# Patient Record
Sex: FEMALE | Race: WHITE | Employment: OTHER | URBAN - METROPOLITAN AREA
[De-identification: names, ages, dates, MRNs, and addresses within clinical notes are randomized per-mention and may not be internally consistent; named-entity substitution may affect disease eponyms.]

---

## 2020-08-18 ENCOUNTER — TELEPHONE (OUTPATIENT)
Dept: FAMILY MEDICINE CLINIC | Age: 65
End: 2020-08-18

## 2020-08-21 NOTE — TELEPHONE ENCOUNTER
Spoke to patient and she wants to have her labs checked prior to her appointment to see what is causing the swelling.

## 2020-08-21 NOTE — TELEPHONE ENCOUNTER
Spoke to patient and she wants to have her labs checked prior to her appointment to see what is causing the swelling

## 2020-08-24 DIAGNOSIS — E78.00 PURE HYPERCHOLESTEROLEMIA: ICD-10-CM

## 2020-08-24 DIAGNOSIS — R60.0 LOCALIZED EDEMA: Primary | ICD-10-CM

## 2020-08-24 NOTE — PROGRESS NOTES
Pt was advised to make an appt for concerns about swelling in both feet. She stated that she would like to have her labs checked first before coming in for an appt which seemed reasonable.

## 2020-08-27 DIAGNOSIS — R89.9 ABNORMAL LABORATORY TEST RESULT: Primary | ICD-10-CM

## 2020-08-27 LAB
ALBUMIN SERPL-MCNC: 5 G/DL (ref 3.8–4.8)
ALBUMIN/GLOB SERPL: 2.6 {RATIO} (ref 1.2–2.2)
ALP SERPL-CCNC: 64 IU/L (ref 39–117)
ALT SERPL-CCNC: 13 IU/L (ref 0–32)
AST SERPL-CCNC: 17 IU/L (ref 0–40)
BASOPHILS # BLD AUTO: 0.1 X10E3/UL (ref 0–0.2)
BASOPHILS NFR BLD AUTO: 2 %
BILIRUB SERPL-MCNC: 0.5 MG/DL (ref 0–1.2)
BUN SERPL-MCNC: 19 MG/DL (ref 8–27)
BUN/CREAT SERPL: 22 (ref 12–28)
CALCIUM SERPL-MCNC: 9.5 MG/DL (ref 8.7–10.3)
CHLORIDE SERPL-SCNC: 94 MMOL/L (ref 96–106)
CHOLEST SERPL-MCNC: 243 MG/DL (ref 100–199)
CO2 SERPL-SCNC: 27 MMOL/L (ref 20–29)
CREAT SERPL-MCNC: 0.87 MG/DL (ref 0.57–1)
EOSINOPHIL # BLD AUTO: 0.3 X10E3/UL (ref 0–0.4)
EOSINOPHIL NFR BLD AUTO: 7 %
ERYTHROCYTE [DISTWIDTH] IN BLOOD BY AUTOMATED COUNT: 12.8 % (ref 11.7–15.4)
GLOBULIN SER CALC-MCNC: 1.9 G/DL (ref 1.5–4.5)
GLUCOSE SERPL-MCNC: 107 MG/DL (ref 65–99)
HCT VFR BLD AUTO: 35.3 % (ref 34–46.6)
HDLC SERPL-MCNC: 62 MG/DL
HGB BLD-MCNC: 12.1 G/DL (ref 11.1–15.9)
IMM GRANULOCYTES # BLD AUTO: 0 X10E3/UL (ref 0–0.1)
IMM GRANULOCYTES NFR BLD AUTO: 0 %
LDLC SERPL CALC-MCNC: 153 MG/DL (ref 0–99)
LYMPHOCYTES # BLD AUTO: 1.4 X10E3/UL (ref 0.7–3.1)
LYMPHOCYTES NFR BLD AUTO: 34 %
MCH RBC QN AUTO: 32 PG (ref 26.6–33)
MCHC RBC AUTO-ENTMCNC: 34.3 G/DL (ref 31.5–35.7)
MCV RBC AUTO: 93 FL (ref 79–97)
MONOCYTES # BLD AUTO: 0.4 X10E3/UL (ref 0.1–0.9)
MONOCYTES NFR BLD AUTO: 11 %
NEUTROPHILS # BLD AUTO: 1.8 X10E3/UL (ref 1.4–7)
NEUTROPHILS NFR BLD AUTO: 46 %
PLATELET # BLD AUTO: 248 X10E3/UL (ref 150–450)
POTASSIUM SERPL-SCNC: 4.4 MMOL/L (ref 3.5–5.2)
PROT SERPL-MCNC: 6.9 G/DL (ref 6–8.5)
RBC # BLD AUTO: 3.78 X10E6/UL (ref 3.77–5.28)
RETICS/RBC NFR AUTO: 1.5 % (ref 0.6–2.6)
SODIUM SERPL-SCNC: 135 MMOL/L (ref 134–144)
TRIGL SERPL-MCNC: 139 MG/DL (ref 0–149)
VLDLC SERPL CALC-MCNC: 28 MG/DL (ref 5–40)
WBC # BLD AUTO: 4 X10E3/UL (ref 3.4–10.8)

## 2020-08-27 NOTE — PROGRESS NOTES
Error note: Wrong note regarding lab results and liver function tests were put in pt's chart. I communicated w/ staff not to call this pt w/ erroneous information.

## 2020-08-27 NOTE — PROGRESS NOTES
Staff was notified to contact pt regarding elevated liver function test and new order for additional lab work to check for hepatitis.  Also asked that labs be faxed to GI Dr. Rebekah Rios office where he has been referred/

## 2020-12-02 ENCOUNTER — TELEPHONE (OUTPATIENT)
Dept: FAMILY MEDICINE CLINIC | Age: 65
End: 2020-12-02

## 2020-12-21 ENCOUNTER — VIRTUAL VISIT (OUTPATIENT)
Dept: FAMILY MEDICINE CLINIC | Age: 65
End: 2020-12-21
Payer: COMMERCIAL

## 2020-12-21 DIAGNOSIS — F41.9 ANXIETY: ICD-10-CM

## 2020-12-21 DIAGNOSIS — E78.00 PURE HYPERCHOLESTEROLEMIA: ICD-10-CM

## 2020-12-21 DIAGNOSIS — Z13.1 SCREENING FOR DIABETES MELLITUS: ICD-10-CM

## 2020-12-21 DIAGNOSIS — Z13.29 SCREENING FOR THYROID DISORDER: ICD-10-CM

## 2020-12-21 DIAGNOSIS — G43.709 CHRONIC MIGRAINE WITHOUT AURA WITHOUT STATUS MIGRAINOSUS, NOT INTRACTABLE: ICD-10-CM

## 2020-12-21 DIAGNOSIS — I10 ESSENTIAL HYPERTENSION: Primary | ICD-10-CM

## 2020-12-21 DIAGNOSIS — Z13.21 ENCOUNTER FOR VITAMIN DEFICIENCY SCREENING: ICD-10-CM

## 2020-12-21 PROBLEM — L90.0 LICHEN SCLEROSUS ET ATROPHICUS: Status: ACTIVE | Noted: 2019-07-24

## 2020-12-21 PROBLEM — D07.1 VULVAR INTRAEPITHELIAL NEOPLASIA (VIN) GRADE 3: Status: ACTIVE | Noted: 2019-07-24

## 2020-12-21 PROBLEM — Z90.710 H/O: HYSTERECTOMY: Status: ACTIVE | Noted: 2019-07-24

## 2020-12-21 PROCEDURE — 99214 OFFICE O/P EST MOD 30 MIN: CPT | Performed by: NURSE PRACTITIONER

## 2020-12-21 RX ORDER — BUTALBITAL, ACETAMINOPHEN AND CAFFEINE 50; 325; 40 MG/1; MG/1; MG/1
1 TABLET ORAL AS NEEDED
COMMUNITY
End: 2020-12-21 | Stop reason: SDUPTHER

## 2020-12-21 RX ORDER — BUTALBITAL, ACETAMINOPHEN AND CAFFEINE 50; 325; 40 MG/1; MG/1; MG/1
TABLET ORAL
Qty: 8 TAB | Refills: 0 | Status: SHIPPED | OUTPATIENT
Start: 2020-12-21 | End: 2021-04-08

## 2020-12-21 RX ORDER — BUTALBITAL, ACETAMINOPHEN AND CAFFEINE 50; 325; 40 MG/1; MG/1; MG/1
1 TABLET ORAL AS NEEDED
COMMUNITY
Start: 2020-12-17 | End: 2020-12-21 | Stop reason: SDUPTHER

## 2020-12-21 RX ORDER — ACYCLOVIR 400 MG/1
TABLET ORAL
COMMUNITY
Start: 2020-10-26 | End: 2022-03-29 | Stop reason: SDUPTHER

## 2020-12-21 RX ORDER — GLUCOSAMINE/CHONDR SU A SOD 750-600 MG
5000 TABLET ORAL
COMMUNITY

## 2020-12-21 RX ORDER — ZOSTER VACCINE RECOMBINANT, ADJUVANTED 50 MCG/0.5
KIT INTRAMUSCULAR
COMMUNITY
End: 2022-09-06 | Stop reason: ALTCHOICE

## 2020-12-21 RX ORDER — ATENOLOL 100 MG/1
TABLET ORAL
COMMUNITY
End: 2020-12-21 | Stop reason: SDUPTHER

## 2020-12-21 RX ORDER — LISINOPRIL 20 MG/1
TABLET ORAL
COMMUNITY
Start: 2020-09-14 | End: 2020-12-21 | Stop reason: SDUPTHER

## 2020-12-21 RX ORDER — ESCITALOPRAM OXALATE 20 MG/1
TABLET ORAL
COMMUNITY

## 2020-12-21 RX ORDER — FLUCONAZOLE 100 MG/1
100 TABLET ORAL
COMMUNITY
End: 2021-12-31 | Stop reason: ALTCHOICE

## 2020-12-21 RX ORDER — HYDROQUINONE 40 MG/G
CREAM TOPICAL
COMMUNITY

## 2020-12-21 RX ORDER — LISINOPRIL 20 MG/1
20 TABLET ORAL 2 TIMES DAILY
Qty: 180 TAB | Refills: 3 | Status: SHIPPED | OUTPATIENT
Start: 2020-12-21 | End: 2021-07-10 | Stop reason: SDUPTHER

## 2020-12-21 RX ORDER — AMLODIPINE BESYLATE 10 MG/1
TABLET ORAL
Qty: 90 TAB | Refills: 3 | Status: SHIPPED | OUTPATIENT
Start: 2020-12-21 | End: 2021-07-10 | Stop reason: SDUPTHER

## 2020-12-21 RX ORDER — HYDROCHLOROTHIAZIDE 12.5 MG/1
CAPSULE ORAL
Qty: 180 CAP | Refills: 3 | Status: SHIPPED | OUTPATIENT
Start: 2020-12-21 | End: 2021-10-27 | Stop reason: SDUPTHER

## 2020-12-21 RX ORDER — DEXTROAMPHETAMINE SACCHARATE, AMPHETAMINE ASPARTATE, DEXTROAMPHETAMINE SULFATE AND AMPHETAMINE SULFATE 5; 5; 5; 5 MG/1; MG/1; MG/1; MG/1
TABLET ORAL
COMMUNITY
Start: 2020-12-07 | End: 2022-03-28 | Stop reason: ALTCHOICE

## 2020-12-21 RX ORDER — HYDROCORTISONE 25 MG/G
CREAM TOPICAL
COMMUNITY
End: 2022-03-29 | Stop reason: SDUPTHER

## 2020-12-21 RX ORDER — ATENOLOL 100 MG/1
TABLET ORAL
Qty: 90 TAB | Refills: 3 | Status: SHIPPED | OUTPATIENT
Start: 2020-12-21 | End: 2021-07-10 | Stop reason: SDUPTHER

## 2020-12-21 RX ORDER — FLUCONAZOLE 100 MG/1
TABLET ORAL
COMMUNITY
Start: 2020-11-18 | End: 2020-12-21 | Stop reason: SDUPTHER

## 2020-12-21 RX ORDER — HYDROCHLOROTHIAZIDE 12.5 MG/1
CAPSULE ORAL
COMMUNITY
End: 2020-12-21 | Stop reason: SDUPTHER

## 2020-12-21 NOTE — PROGRESS NOTES
No chief complaint on file. There were no vitals taken for this visit. Nyasia Mo is a 63 y.o. female. HPI: This is a video visit performed with doxy. me due to COVID-19 Pandemic. It utilizes video and audio technology that allows real time interaction with the patient. Pt realizes that this is a billable charge and agrees to proceed. Pt presented for f/u and medication refill. Not seen for an appt since Aug 2019. HTN- Not always well controlled. Sometimes as high as 185/85. Pt attributes elevations to anxiety and panic attacks. Does not always remember to check B/P regularly. Anxiety- Continues under care of psychiatrist w/ regular appts every 3 months. Psychiatrist ordering alprazolam, Adderal, quetiapine, Lexapro. Hyperlipidemia- Needs labs to check status. Last labs w/ elevated LDL of 153. Pt does not want to be on another medication so has been focusing on lifestyle changes. Other labs are due- will have staff mail lab slips to pt and can do at her convenience in early 2021. 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     ALPRAZolam (XANAX) 0.5 mg, 4 TIMES DAILY AS NEEDED    amLODIPine (NORVASC) 10 mg tablet TAKE 1 TABLET BY MOUTH DAILY FOR BLOOD PRESSURE.  atenoloL (TENORMIN) 100 mg tablet Take 1 tab by mouth daily.  Biotin 5,000 mcg, Oral    butalbital-acetaminophen-caffeine (FIORICET, ESGIC) -40 mg per tablet Take 1 tab by mouth when other headache remedies not effective. Use sparingly- do not exceed 8 tablets monthly.  calcium-cholecalciferol, d3, (CALCIUM 600 + D) 600-125 mg-unit tab Take  by mouth.  dextroamphetamine-amphetamine (ADDERALL) 20 mg tablet No dose, route, or frequency recorded.  escitalopram oxalate (LEXAPRO) 20 mg tablet escitalopram 20 mg tablet    fluconazole (DIFLUCAN) 100 mg, Oral, EVERY 7 DAYS, FDA advises cautious prescribing of oral fluconazole in pregnancy.  hydroCHLOROthiazide (MICROZIDE) 12.5 mg capsule Take 1 capsule by mouth 2 x daily for blood pressure.  hydrocortisone (Proctozone-HC) 2.5 % rectal cream Proctozone-HC 2.5 % topical cream perineal applicator    hydroquinone (ESOTERICA, MELQUIN) 4 % topical cream hydroquinone 4 % topical cream   APPLY CREAM TO DARK AREAS ON BODY TWICE DAILY    lisinopriL (PRINIVIL, ZESTRIL) 20 mg, Oral, 2 TIMES DAILY    QUEtiapine (SEROQUEL) 50 mg, EVERY BEDTIME    sennosides/docusate sodium (SENOKOT-S PO) 2 Tabs, Oral, DAILY    varicella-zoster recombinant, PF, (Shingrix, PF,) 50 mcg/0.5 mL susr injection Shingrix (PF) 50 mcg/0.5 mL intramuscular suspension, kit     Allergies   Allergen Reactions    Adhesive Other (comments)     REDNESS AND BURNING    Codeine Nausea and Vomiting    Hydrocodone Unknown (comments)     High anxiety    Morphine Other (comments)     Major anxiety    Oxycodone-Acetaminophen Other (comments)     aniexty     Social History     Tobacco Use    Smoking status: Former Smoker     Packs/day: 0.25     Years: 8.00     Pack years: 2.00     Quit date: 1991     Years since quittin.9    Smokeless tobacco: Never Used   Substance Use Topics    Alcohol use:  Yes Frequency: 2-3 times a week     Comment: RARELY    Drug use: No     Family History   Problem Relation Age of Onset    Cancer Mother 68        ovarian cancer WITH METS TO LIVER AND LUNGS AND MELANOMA    Anesth Problems Mother         N&V    Other Mother         LIVER CANCER    Hypertension Father     Cancer Father         MELANOMA    Cancer Sister         MELANOMA    Alzheimer Maternal Grandmother     Alzheimer Maternal Grandfather     Cancer Paternal Grandmother         LIVER    Cancer Sister         MELANOMA    Asthma Daughter         SPORTS INDUCED    Other Daughter         ? UTERINE POLYP     Review of Systems   Constitutional: Negative for chills and fever. HENT: Negative for ear pain and sore throat. Respiratory: Negative for cough, shortness of breath and wheezing. Cardiovascular: Negative for chest pain and palpitations. Gastrointestinal: Negative for abdominal pain, diarrhea, heartburn, nausea and vomiting. Genitourinary: Negative for dysuria. Musculoskeletal: Positive for back pain and neck pain. Negative for myalgias. Neurological: Negative for dizziness, tingling, sensory change and headaches. Psychiatric/Behavioral: Positive for depression. The patient is nervous/anxious and has insomnia. Objective  Physical Exam  Constitutional:       General: She is not in acute distress. Appearance: Normal appearance. HENT:      Head: Normocephalic. Right Ear: External ear normal.      Left Ear: External ear normal.      Nose: Nose normal.   Eyes:      Conjunctiva/sclera: Conjunctivae normal.   Neck:      Musculoskeletal: Neck supple. Pulmonary:      Effort: Pulmonary effort is normal.   Musculoskeletal: Normal range of motion. Comments: Of visible extremities   Skin:     Coloration: Skin is not jaundiced. Neurological:      Mental Status: She is alert and oriented to person, place, and time.    Psychiatric:         Mood and Affect: Mood normal. Behavior: Behavior normal.         Thought Content: Thought content normal.         Judgment: Judgment normal.       Assessment & Plan      ICD-10-CM ICD-9-CM    1. Essential hypertension  I10 401.9 lisinopriL (PRINIVIL, ZESTRIL) 20 mg tablet      atenoloL (TENORMIN) 100 mg tablet      amLODIPine (NORVASC) 10 mg tablet      hydroCHLOROthiazide (MICROZIDE) 12.5 mg capsule   2. Pure hypercholesterolemia  E78.00 272.0 CBC WITH AUTOMATED DIFF      LIPID PANEL      RETICULOCYTE COUNT   3. Chronic migraine without aura without status migrainosus, not intractable  G43.709 346.70 butalbital-acetaminophen-caffeine (FIORICET, ESGIC) -40 mg per tablet   4. Anxiety  F41.9 300.00    5. Encounter for vitamin deficiency screening  Z13.21 V77.99 VITAMIN D, 25 HYDROXY   6. Screening for thyroid disorder  Z13.29 V77.0 T4, FREE      TSH 3RD GENERATION   7. Screening for diabetes mellitus  A02.4 I77.0 METABOLIC PANEL, COMPREHENSIVE      HEMOGLOBIN A1C WITH EAG     1. Essential hypertension  Pt advised to check B/P at least 3 x week w/ goal < than 130/80. Should let me know if not in this range consistently. Pt is on Adderal which might be a factor complicating B/P control. Pt stated she takes 1/2 tab and not full dose. May need to refer to cardiology for B/P management if not effective. - lisinopriL (PRINIVIL, ZESTRIL) 20 mg tablet; Take 1 Tab by mouth two (2) times a day. Dispense: 180 Tab; Refill: 3  - atenoloL (TENORMIN) 100 mg tablet; Take 1 tab by mouth daily. Indications: high blood pressure  Dispense: 90 Tab; Refill: 3  - amLODIPine (NORVASC) 10 mg tablet; TAKE 1 TABLET BY MOUTH DAILY FOR BLOOD PRESSURE. Indications: high blood pressure  Dispense: 90 Tab; Refill: 3  - hydroCHLOROthiazide (MICROZIDE) 12.5 mg capsule; Take 1 capsule by mouth 2 x daily for blood pressure. Dispense: 180 Cap; Refill: 3    2. Pure hypercholesterolemia  F/U labs.  Pt will continue to work on life style measures as she does not want to take a statin. - CBC WITH AUTOMATED DIFF  - LIPID PANEL  - RETICULOCYTE COUNT    3. Chronic migraine without aura without status migrainosus, not intractable  Reviewed possible side effect of rebound headaches if used too often. Will limit to 8 tabs/monthly as needed. - butalbital-acetaminophen-caffeine (FIORICET, ESGIC) -40 mg per tablet; Take 1 tab by mouth when other headache remedies not effective. Use sparingly- do not exceed 8 tablets monthly. Dispense: 8 Tab; Refill: 0    4. Anxiety  Continues under care of psychiatrist w/ regular appts every 3 months for medication management. 5. Encounter for vitamin deficiency screening  F/U labs. - VITAMIN D, 25 HYDROXY; Future    6. Screening for thyroid disorder  F/U labs. - T4, FREE  - TSH 3RD GENERATION    7. Screening for diabetes mellitus  F/U labs. - METABOLIC PANEL, COMPREHENSIVE  - HEMOGLOBIN A1C WITH EAG    I have discussed the diagnosis with the patient and the intended plan as seen in the above orders. Pt/caretaker has expressed understanding. Questions were answered concerning future plans. I have discussed medication side effects and warnings as indicated with the patient as well.     Tammy Davis, TAL

## 2020-12-30 ENCOUNTER — PATIENT MESSAGE (OUTPATIENT)
Dept: FAMILY MEDICINE CLINIC | Age: 65
End: 2020-12-30

## 2021-01-02 DIAGNOSIS — E78.00 PURE HYPERCHOLESTEROLEMIA: Primary | ICD-10-CM

## 2021-01-02 RX ORDER — ATORVASTATIN CALCIUM 40 MG/1
40 TABLET, FILM COATED ORAL DAILY
Qty: 90 TAB | Refills: 3 | Status: SHIPPED | OUTPATIENT
Start: 2021-01-02 | End: 2021-01-12 | Stop reason: ALTCHOICE

## 2021-01-07 ENCOUNTER — PATIENT MESSAGE (OUTPATIENT)
Dept: FAMILY MEDICINE CLINIC | Age: 66
End: 2021-01-07

## 2021-01-12 ENCOUNTER — TELEPHONE (OUTPATIENT)
Dept: FAMILY MEDICINE CLINIC | Age: 66
End: 2021-01-12

## 2021-01-12 DIAGNOSIS — E78.00 PURE HYPERCHOLESTEROLEMIA: Primary | ICD-10-CM

## 2021-01-12 RX ORDER — EZETIMIBE 10 MG/1
10 TABLET ORAL DAILY
Qty: 90 TAB | Refills: 3 | Status: SHIPPED | OUTPATIENT
Start: 2021-01-12 | End: 2021-02-24 | Stop reason: SDUPTHER

## 2021-01-13 LAB
ALBUMIN SERPL-MCNC: 4.8 G/DL (ref 3.8–4.8)
ALBUMIN/GLOB SERPL: 1.9 {RATIO} (ref 1.2–2.2)
ALP SERPL-CCNC: 72 IU/L (ref 39–117)
ALT SERPL-CCNC: 8 IU/L (ref 0–32)
AST SERPL-CCNC: 16 IU/L (ref 0–40)
BASOPHILS # BLD AUTO: 0.1 X10E3/UL (ref 0–0.2)
BASOPHILS NFR BLD AUTO: 2 %
BILIRUB SERPL-MCNC: 0.5 MG/DL (ref 0–1.2)
BUN SERPL-MCNC: 18 MG/DL (ref 8–27)
BUN/CREAT SERPL: 21 (ref 12–28)
CALCIUM SERPL-MCNC: 9.2 MG/DL (ref 8.7–10.3)
CHLORIDE SERPL-SCNC: 97 MMOL/L (ref 96–106)
CHOLEST SERPL-MCNC: 235 MG/DL (ref 100–199)
CO2 SERPL-SCNC: 22 MMOL/L (ref 20–29)
CREAT SERPL-MCNC: 0.85 MG/DL (ref 0.57–1)
EOSINOPHIL # BLD AUTO: 0.2 X10E3/UL (ref 0–0.4)
EOSINOPHIL NFR BLD AUTO: 7 %
ERYTHROCYTE [DISTWIDTH] IN BLOOD BY AUTOMATED COUNT: 12.7 % (ref 11.7–15.4)
EST. AVERAGE GLUCOSE BLD GHB EST-MCNC: 97 MG/DL
GLOBULIN SER CALC-MCNC: 2.5 G/DL (ref 1.5–4.5)
GLUCOSE SERPL-MCNC: 99 MG/DL (ref 65–99)
HBA1C MFR BLD: 5 % (ref 4.8–5.6)
HCT VFR BLD AUTO: 38.3 % (ref 34–46.6)
HDLC SERPL-MCNC: 68 MG/DL
HGB BLD-MCNC: 13 G/DL (ref 11.1–15.9)
IMM GRANULOCYTES # BLD AUTO: 0 X10E3/UL (ref 0–0.1)
IMM GRANULOCYTES NFR BLD AUTO: 0 %
LDLC SERPL CALC-MCNC: 153 MG/DL (ref 0–99)
LYMPHOCYTES # BLD AUTO: 1.3 X10E3/UL (ref 0.7–3.1)
LYMPHOCYTES NFR BLD AUTO: 41 %
MCH RBC QN AUTO: 32.7 PG (ref 26.6–33)
MCHC RBC AUTO-ENTMCNC: 33.9 G/DL (ref 31.5–35.7)
MCV RBC AUTO: 96 FL (ref 79–97)
MONOCYTES # BLD AUTO: 0.4 X10E3/UL (ref 0.1–0.9)
MONOCYTES NFR BLD AUTO: 12 %
NEUTROPHILS # BLD AUTO: 1.2 X10E3/UL (ref 1.4–7)
NEUTROPHILS NFR BLD AUTO: 38 %
PLATELET # BLD AUTO: 236 X10E3/UL (ref 150–450)
POTASSIUM SERPL-SCNC: 4.7 MMOL/L (ref 3.5–5.2)
PROT SERPL-MCNC: 7.3 G/DL (ref 6–8.5)
RBC # BLD AUTO: 3.98 X10E6/UL (ref 3.77–5.28)
RETICS/RBC NFR AUTO: 1.3 % (ref 0.6–2.6)
SODIUM SERPL-SCNC: 133 MMOL/L (ref 134–144)
T4 FREE SERPL-MCNC: 1.05 NG/DL (ref 0.82–1.77)
TRIGL SERPL-MCNC: 81 MG/DL (ref 0–149)
TSH SERPL DL<=0.005 MIU/L-ACNC: 2.05 UIU/ML (ref 0.45–4.5)
VLDLC SERPL CALC-MCNC: 14 MG/DL (ref 5–40)
WBC # BLD AUTO: 3.1 X10E3/UL (ref 3.4–10.8)

## 2021-01-13 NOTE — PROGRESS NOTES
D/C'd atorvastatin due to chance of potential interaction w/ weekly diflucan which can cause myopathy. Changed to zetia.

## 2021-01-13 NOTE — TELEPHONE ENCOUNTER
Pt taking diflucan weekly. Ordered atorvastatin which has potential interaction to increase atorvastatin levels and cause muscle damage. Will check medication to zetia.

## 2021-01-19 DIAGNOSIS — R92.2 INCONCLUSIVE MAMMOGRAM DUE TO DENSE BREASTS: Primary | ICD-10-CM

## 2021-01-19 NOTE — PROGRESS NOTES
Mammogram report recommended pt get US of both breasts due to density. Order put in. Pt notified via [address]

## 2021-01-22 ENCOUNTER — PATIENT MESSAGE (OUTPATIENT)
Dept: FAMILY MEDICINE CLINIC | Age: 66
End: 2021-01-22

## 2021-02-01 NOTE — TELEPHONE ENCOUNTER
Sent patient a message via [employer] on December 17th and she read it December 21st. And has yet to make an appointment in office or virtual.

## 2021-02-02 ENCOUNTER — PATIENT MESSAGE (OUTPATIENT)
Dept: FAMILY MEDICINE CLINIC | Age: 66
End: 2021-02-02

## 2021-02-03 ENCOUNTER — TELEPHONE (OUTPATIENT)
Dept: FAMILY MEDICINE CLINIC | Age: 66
End: 2021-02-03

## 2021-02-03 NOTE — TELEPHONE ENCOUNTER
Luis Reynoso with [employer] wants you to give him a call to do the peer to peer for Mrs. Annabelle Ovalle medication. His number is [phone].

## 2021-02-24 DIAGNOSIS — E78.00 PURE HYPERCHOLESTEROLEMIA: ICD-10-CM

## 2021-02-24 RX ORDER — EZETIMIBE 10 MG/1
10 TABLET ORAL DAILY
Qty: 90 TAB | Refills: 3 | Status: SHIPPED | OUTPATIENT
Start: 2021-02-24 | End: 2021-12-29 | Stop reason: SDUPTHER

## 2021-02-24 NOTE — TELEPHONE ENCOUNTER
Pt stated that the medication has not come from mail order pharmacy yet so I sent in the order again.

## 2021-04-03 DIAGNOSIS — G43.709 CHRONIC MIGRAINE WITHOUT AURA WITHOUT STATUS MIGRAINOSUS, NOT INTRACTABLE: ICD-10-CM

## 2021-04-08 RX ORDER — BUTALBITAL, ACETAMINOPHEN AND CAFFEINE 50; 325; 40 MG/1; MG/1; MG/1
TABLET ORAL
Qty: 8 TAB | Refills: 0 | Status: SHIPPED | OUTPATIENT
Start: 2021-04-08 | End: 2021-04-28

## 2021-04-27 DIAGNOSIS — G43.709 CHRONIC MIGRAINE WITHOUT AURA WITHOUT STATUS MIGRAINOSUS, NOT INTRACTABLE: ICD-10-CM

## 2021-04-28 RX ORDER — BUTALBITAL, ACETAMINOPHEN AND CAFFEINE 50; 325; 40 MG/1; MG/1; MG/1
TABLET ORAL
Qty: 8 TAB | Refills: 0 | Status: SHIPPED | OUTPATIENT
Start: 2021-04-28 | End: 2021-05-25

## 2021-05-21 DIAGNOSIS — G43.709 CHRONIC MIGRAINE WITHOUT AURA WITHOUT STATUS MIGRAINOSUS, NOT INTRACTABLE: ICD-10-CM

## 2021-05-25 RX ORDER — BUTALBITAL, ACETAMINOPHEN AND CAFFEINE 50; 325; 40 MG/1; MG/1; MG/1
TABLET ORAL
Qty: 8 TABLET | Refills: 0 | Status: SHIPPED | OUTPATIENT
Start: 2021-05-25 | End: 2021-06-25

## 2021-06-13 DIAGNOSIS — G43.709 CHRONIC MIGRAINE WITHOUT AURA WITHOUT STATUS MIGRAINOSUS, NOT INTRACTABLE: ICD-10-CM

## 2021-06-25 RX ORDER — BUTALBITAL, ACETAMINOPHEN AND CAFFEINE 50; 325; 40 MG/1; MG/1; MG/1
TABLET ORAL
Qty: 8 TABLET | Refills: 0 | Status: SHIPPED | OUTPATIENT
Start: 2021-06-25 | End: 2021-12-29 | Stop reason: SDUPTHER

## 2021-06-27 DIAGNOSIS — M79.89 SWOLLEN FINGER: Primary | ICD-10-CM

## 2021-07-10 DIAGNOSIS — E78.00 PURE HYPERCHOLESTEROLEMIA: ICD-10-CM

## 2021-07-10 DIAGNOSIS — E55.9 VITAMIN D DEFICIENCY: Primary | ICD-10-CM

## 2021-07-10 DIAGNOSIS — I10 ESSENTIAL HYPERTENSION: ICD-10-CM

## 2021-07-10 RX ORDER — ATENOLOL 100 MG/1
TABLET ORAL
Qty: 90 TABLET | Refills: 1 | Status: SHIPPED | OUTPATIENT
Start: 2021-07-10 | End: 2021-10-27 | Stop reason: SDUPTHER

## 2021-07-10 RX ORDER — AMLODIPINE BESYLATE 10 MG/1
TABLET ORAL
Qty: 90 TABLET | Refills: 1 | Status: SHIPPED | OUTPATIENT
Start: 2021-07-10 | End: 2021-10-27 | Stop reason: SDUPTHER

## 2021-07-10 RX ORDER — LISINOPRIL 20 MG/1
20 TABLET ORAL 2 TIMES DAILY
Qty: 180 TABLET | Refills: 1 | Status: SHIPPED | OUTPATIENT
Start: 2021-07-10 | End: 2021-10-06 | Stop reason: SINTOL

## 2021-07-16 DIAGNOSIS — J40 BRONCHITIS: Primary | ICD-10-CM

## 2021-07-16 RX ORDER — BENZONATATE 100 MG/1
100 CAPSULE ORAL
Qty: 30 CAPSULE | Refills: 0 | Status: SHIPPED | OUTPATIENT
Start: 2021-07-16 | End: 2021-07-26

## 2021-07-16 RX ORDER — PREDNISONE 20 MG/1
20 TABLET ORAL
Qty: 5 TABLET | Refills: 0 | Status: SHIPPED | OUTPATIENT
Start: 2021-07-16 | End: 2021-07-21

## 2021-07-16 RX ORDER — DOXYCYCLINE 100 MG/1
100 CAPSULE ORAL 2 TIMES DAILY
Qty: 10 CAPSULE | Refills: 0 | Status: SHIPPED | OUTPATIENT
Start: 2021-07-16 | End: 2021-07-21

## 2021-07-16 NOTE — PROGRESS NOTES
Pt still having bronchitis symptoms. She was seen at [facility] and received Rxes. She wanted to repeat course of medications. Meds sent in.

## 2021-07-17 LAB
25(OH)D3+25(OH)D2 SERPL-MCNC: 43.3 NG/ML (ref 30–100)
ALBUMIN SERPL-MCNC: 4.8 G/DL (ref 3.8–4.8)
ALBUMIN/GLOB SERPL: 2.3 {RATIO} (ref 1.2–2.2)
ALP SERPL-CCNC: 71 IU/L (ref 48–121)
ALT SERPL-CCNC: 14 IU/L (ref 0–32)
AST SERPL-CCNC: 19 IU/L (ref 0–40)
BASOPHILS # BLD AUTO: 0.1 X10E3/UL (ref 0–0.2)
BASOPHILS NFR BLD AUTO: 2 %
BILIRUB SERPL-MCNC: 0.4 MG/DL (ref 0–1.2)
BUN SERPL-MCNC: 16 MG/DL (ref 8–27)
BUN/CREAT SERPL: 23 (ref 12–28)
CALCIUM SERPL-MCNC: 9.4 MG/DL (ref 8.7–10.3)
CHLORIDE SERPL-SCNC: 90 MMOL/L (ref 96–106)
CHOLEST SERPL-MCNC: 216 MG/DL (ref 100–199)
CO2 SERPL-SCNC: 29 MMOL/L (ref 20–29)
CREAT SERPL-MCNC: 0.7 MG/DL (ref 0.57–1)
EOSINOPHIL # BLD AUTO: 0.3 X10E3/UL (ref 0–0.4)
EOSINOPHIL NFR BLD AUTO: 9 %
ERYTHROCYTE [DISTWIDTH] IN BLOOD BY AUTOMATED COUNT: 12.9 % (ref 11.7–15.4)
GLOBULIN SER CALC-MCNC: 2.1 G/DL (ref 1.5–4.5)
GLUCOSE SERPL-MCNC: 96 MG/DL (ref 65–99)
HCT VFR BLD AUTO: 39.2 % (ref 34–46.6)
HDLC SERPL-MCNC: 57 MG/DL
HGB BLD-MCNC: 13.1 G/DL (ref 11.1–15.9)
IMM GRANULOCYTES # BLD AUTO: 0 X10E3/UL (ref 0–0.1)
IMM GRANULOCYTES NFR BLD AUTO: 0 %
LDLC SERPL CALC-MCNC: 136 MG/DL (ref 0–99)
LYMPHOCYTES # BLD AUTO: 1.3 X10E3/UL (ref 0.7–3.1)
LYMPHOCYTES NFR BLD AUTO: 41 %
MCH RBC QN AUTO: 32.5 PG (ref 26.6–33)
MCHC RBC AUTO-ENTMCNC: 33.4 G/DL (ref 31.5–35.7)
MCV RBC AUTO: 97 FL (ref 79–97)
MONOCYTES # BLD AUTO: 0.5 X10E3/UL (ref 0.1–0.9)
MONOCYTES NFR BLD AUTO: 15 %
MORPHOLOGY BLD-IMP: ABNORMAL
NEUTROPHILS # BLD AUTO: 1 X10E3/UL (ref 1.4–7)
NEUTROPHILS NFR BLD AUTO: 33 %
PLATELET # BLD AUTO: 219 X10E3/UL (ref 150–450)
POTASSIUM SERPL-SCNC: 3.7 MMOL/L (ref 3.5–5.2)
PROT SERPL-MCNC: 6.9 G/DL (ref 6–8.5)
RBC # BLD AUTO: 4.03 X10E6/UL (ref 3.77–5.28)
RETICS/RBC NFR AUTO: 1.6 % (ref 0.6–2.6)
SODIUM SERPL-SCNC: 130 MMOL/L (ref 134–144)
TRIGL SERPL-MCNC: 131 MG/DL (ref 0–149)
VLDLC SERPL CALC-MCNC: 23 MG/DL (ref 5–40)
WBC # BLD AUTO: 3.1 X10E3/UL (ref 3.4–10.8)

## 2021-07-18 DIAGNOSIS — E87.1 HYPONATREMIA: Primary | ICD-10-CM

## 2021-07-18 RX ORDER — SODIUM CHLORIDE TAB 1 GM 1 G
1 TAB MISCELLANEOUS DAILY
Qty: 90 TABLET | Refills: 1 | Status: SHIPPED | OUTPATIENT
Start: 2021-07-18 | End: 2022-03-28 | Stop reason: ALTCHOICE

## 2021-07-18 NOTE — PROGRESS NOTES
Pt w/ low sodium level of 130. She is taking Lexapro daily which has hyponatremia as a side effect. Ordered salt tablets for daily use.

## 2021-07-18 NOTE — PROGRESS NOTES
Sent [employer] message to pt advising she get repeat lab work to check sodium level again after about 2 weeks of daily salt tablets.

## 2021-07-30 LAB
BUN SERPL-MCNC: 17 MG/DL (ref 8–27)
BUN/CREAT SERPL: 23 (ref 12–28)
CALCIUM SERPL-MCNC: 9.6 MG/DL (ref 8.7–10.3)
CHLORIDE SERPL-SCNC: 96 MMOL/L (ref 96–106)
CO2 SERPL-SCNC: 26 MMOL/L (ref 20–29)
CREAT SERPL-MCNC: 0.73 MG/DL (ref 0.57–1)
GLUCOSE SERPL-MCNC: 102 MG/DL (ref 65–99)
POTASSIUM SERPL-SCNC: 4.7 MMOL/L (ref 3.5–5.2)
SODIUM SERPL-SCNC: 135 MMOL/L (ref 134–144)

## 2021-08-17 DIAGNOSIS — D72.818 OTHER DECREASED WHITE BLOOD CELL (WBC) COUNT: Primary | ICD-10-CM

## 2021-09-01 DIAGNOSIS — R09.81 SINUS CONGESTION: Primary | ICD-10-CM

## 2021-10-05 ENCOUNTER — TELEPHONE (OUTPATIENT)
Dept: FAMILY MEDICINE CLINIC | Age: 66
End: 2021-10-05

## 2021-10-05 NOTE — TELEPHONE ENCOUNTER
Patient needs a change for medication for blood pressure has been trying to get some information from you for a couple of weeks please call back

## 2021-10-06 DIAGNOSIS — I10 ESSENTIAL HYPERTENSION: Primary | ICD-10-CM

## 2021-10-06 RX ORDER — LOSARTAN POTASSIUM 50 MG/1
50 TABLET ORAL DAILY
Qty: 90 TABLET | Refills: 1 | Status: SHIPPED | OUTPATIENT
Start: 2021-10-06 | End: 2021-10-07 | Stop reason: SDUPTHER

## 2021-10-06 NOTE — TELEPHONE ENCOUNTER
Pt called back about changing her Blood pressure medication. Pt said you can send it back a message back though the portal and can you send the Rx to her mail order pharmacy [facility].  BRIELLE

## 2021-10-07 DIAGNOSIS — I10 ESSENTIAL HYPERTENSION: ICD-10-CM

## 2021-10-07 RX ORDER — LOSARTAN POTASSIUM 50 MG/1
50 TABLET ORAL DAILY
Qty: 90 TABLET | Refills: 3 | Status: SHIPPED | OUTPATIENT
Start: 2021-10-07 | End: 2021-12-28 | Stop reason: ALTCHOICE

## 2021-10-07 NOTE — TELEPHONE ENCOUNTER
I called and told pt that her Rx was call in to her mail order phar. Pt said she did get [facility] portal message.  BRIELLE

## 2021-10-07 NOTE — TELEPHONE ENCOUNTER
I sent losartan to wrong pharmacy- pt wanted mail order. I resent the order to mail order and notified pt via [employer].

## 2021-10-27 DIAGNOSIS — I10 ESSENTIAL HYPERTENSION: ICD-10-CM

## 2021-10-27 RX ORDER — ATENOLOL 100 MG/1
TABLET ORAL
Qty: 90 TABLET | Refills: 3 | Status: SHIPPED | OUTPATIENT
Start: 2021-10-27

## 2021-10-27 RX ORDER — HYDROCHLOROTHIAZIDE 12.5 MG/1
CAPSULE ORAL
Qty: 180 CAPSULE | Refills: 3 | Status: SHIPPED | OUTPATIENT
Start: 2021-10-27 | End: 2021-12-29 | Stop reason: SDUPTHER

## 2021-10-27 RX ORDER — AMLODIPINE BESYLATE 10 MG/1
TABLET ORAL
Qty: 90 TABLET | Refills: 3 | Status: SHIPPED | OUTPATIENT
Start: 2021-10-27 | End: 2021-12-29 | Stop reason: SDUPTHER

## 2021-12-28 ENCOUNTER — TELEPHONE (OUTPATIENT)
Dept: FAMILY MEDICINE CLINIC | Age: 66
End: 2021-12-28

## 2021-12-28 RX ORDER — LISINOPRIL 20 MG/1
20 TABLET ORAL DAILY
Qty: 90 TABLET | Refills: 1 | Status: SHIPPED | OUTPATIENT
Start: 2021-12-28 | End: 2022-03-28 | Stop reason: ALTCHOICE

## 2021-12-28 NOTE — TELEPHONE ENCOUNTER
----- Message from Richelle Lee sent at 12/28/2021 11:09 AM EST -----  Subject: Medication Problem    QUESTIONS  Name of Medication? lisinopriL (PRINIVIL, ZESTRIL) 20 mg tablet  Patient-reported dosage and instructions? Take 1 Tablet by mouth daily. What question or problem do you have with the medication? urgent within 24   hrs pt requested pt said doesnt take this medication bc she coughs, thinks   she is supposed to be taking lipitor instead of but isn't sure, also, it   was sent to the wrong pharmacy. pt would like a call back due to [employer]   miscommunication  Preferred Pharmacy? [address] phone number (if available)? [phone]  Additional Information for Provider? i didn't see ankit on file, but   she wanted to let you know that it upsets her stomach, she cant remember   what medicineshe stopped taking starts with an f  ---------------------------------------------------------------------------  --------------  CALL BACK INFO  What is the best way for the office to contact you? OK to leave message on   voicemail  Preferred Call Back Phone Number? [phone]  ---------------------------------------------------------------------------  --------------  SCRIPT ANSWERS  Relationship to Patient?  Self

## 2021-12-28 NOTE — TELEPHONE ENCOUNTER
I contacted the patient and she advised that ENT had d/c her Lisinopril Rx as it was the cause of her cough. Patient wanted to relay the following information:  1. Lisinopril d/c by ENT (pharmacy notified [phone])  2. Patient d/c Losartan due to \"GI upset\"  3. Patient currently taking HCTZ, Amlodipine, and Atenolol for b/p control (has not checked b/p while on vacation)  4. GYN advised patient to d/c Zetia as she can now take Lipitor per patient  5. Patient requested that [facility] be removed her pharmacy profile as she only wishes to use mail-order  6. Patient had a question as to whether or not she needs a lab order to recheck her lipids, patient has follow up appointment with [facility] and will likely have lab work done and would like to know if this is needed. Please advise.

## 2021-12-28 NOTE — PROGRESS NOTES
Pt stated that losartan was too hard on her stomach so wanted to go back to lisinopril which has been ordered.

## 2021-12-29 DIAGNOSIS — G43.709 CHRONIC MIGRAINE WITHOUT AURA WITHOUT STATUS MIGRAINOSUS, NOT INTRACTABLE: ICD-10-CM

## 2021-12-29 DIAGNOSIS — E78.00 PURE HYPERCHOLESTEROLEMIA: ICD-10-CM

## 2021-12-29 DIAGNOSIS — I10 ESSENTIAL HYPERTENSION: ICD-10-CM

## 2021-12-31 RX ORDER — AMLODIPINE BESYLATE 10 MG/1
TABLET ORAL
Qty: 90 TABLET | Refills: 3 | Status: SHIPPED | OUTPATIENT
Start: 2021-12-31 | End: 2022-03-29 | Stop reason: SDUPTHER

## 2021-12-31 RX ORDER — EZETIMIBE 10 MG/1
10 TABLET ORAL DAILY
Qty: 90 TABLET | Refills: 3 | Status: SHIPPED | OUTPATIENT
Start: 2021-12-31 | End: 2022-03-28 | Stop reason: ALTCHOICE

## 2021-12-31 RX ORDER — HYDROCHLOROTHIAZIDE 12.5 MG/1
CAPSULE ORAL
Qty: 180 CAPSULE | Refills: 3 | Status: SHIPPED | OUTPATIENT
Start: 2021-12-31 | End: 2022-03-29 | Stop reason: SDUPTHER

## 2021-12-31 RX ORDER — BUTALBITAL, ACETAMINOPHEN AND CAFFEINE 50; 325; 40 MG/1; MG/1; MG/1
TABLET ORAL
Qty: 8 TABLET | Refills: 0 | Status: SHIPPED | OUTPATIENT
Start: 2021-12-31 | End: 2022-03-29 | Stop reason: SDUPTHER

## 2022-03-17 ENCOUNTER — TELEPHONE (OUTPATIENT)
Dept: FAMILY MEDICINE CLINIC | Age: 67
End: 2022-03-17

## 2022-03-17 NOTE — TELEPHONE ENCOUNTER
----- Message from Gianna Blanc sent at 3/17/2022 12:03 PM EDT -----  Subject: Message to Provider    QUESTIONS  Information for Provider? pt is calling to get lab orders put in, pt would   like her rheumatoid arthritis level, thyroid, cholesterol, iron and any   other blood work you might want to at. pt went to a dermatologist and he   told her to have you do these test. pt would like a call when this is   complete. pt would like the orders to go to [facility] across the street.   ---------------------------------------------------------------------------  --------------  [employer]  What is the best way for the office to contact you? OK to leave message on   voicemail  Preferred Call Back Phone Number? [phone]  ---------------------------------------------------------------------------  --------------  SCRIPT ANSWERS  Relationship to Patient?  Self

## 2022-03-19 PROBLEM — Z90.710 H/O: HYSTERECTOMY: Status: ACTIVE | Noted: 2019-07-24

## 2022-03-20 PROBLEM — L90.0 LICHEN SCLEROSUS ET ATROPHICUS: Status: ACTIVE | Noted: 2019-07-24

## 2022-03-20 PROBLEM — D07.1 VULVAR INTRAEPITHELIAL NEOPLASIA (VIN) GRADE 3: Status: ACTIVE | Noted: 2019-07-24

## 2022-03-28 ENCOUNTER — VIRTUAL VISIT (OUTPATIENT)
Dept: FAMILY MEDICINE CLINIC | Age: 67
End: 2022-03-28
Payer: MEDICARE

## 2022-03-28 DIAGNOSIS — I10 ESSENTIAL HYPERTENSION: ICD-10-CM

## 2022-03-28 DIAGNOSIS — B00.1 HERPES LABIALIS: ICD-10-CM

## 2022-03-28 DIAGNOSIS — R73.9 HYPERGLYCEMIA: ICD-10-CM

## 2022-03-28 DIAGNOSIS — K64.9 HEMORRHOIDS, UNSPECIFIED HEMORRHOID TYPE: ICD-10-CM

## 2022-03-28 DIAGNOSIS — M54.2 NECK PAIN: Primary | ICD-10-CM

## 2022-03-28 DIAGNOSIS — E78.00 PURE HYPERCHOLESTEROLEMIA: ICD-10-CM

## 2022-03-28 DIAGNOSIS — K59.00 CONSTIPATION, UNSPECIFIED CONSTIPATION TYPE: ICD-10-CM

## 2022-03-28 DIAGNOSIS — R53.81 MALAISE AND FATIGUE: ICD-10-CM

## 2022-03-28 DIAGNOSIS — E55.9 VITAMIN D DEFICIENCY: ICD-10-CM

## 2022-03-28 DIAGNOSIS — R53.83 MALAISE AND FATIGUE: ICD-10-CM

## 2022-03-28 DIAGNOSIS — R60.9 PERIPHERAL EDEMA: ICD-10-CM

## 2022-03-28 DIAGNOSIS — G43.709 CHRONIC MIGRAINE WITHOUT AURA WITHOUT STATUS MIGRAINOSUS, NOT INTRACTABLE: ICD-10-CM

## 2022-03-28 PROCEDURE — G8756 NO BP MEASURE DOC: HCPCS | Performed by: NURSE PRACTITIONER

## 2022-03-28 PROCEDURE — 99215 OFFICE O/P EST HI 40 MIN: CPT | Performed by: NURSE PRACTITIONER

## 2022-03-28 PROCEDURE — G9899 SCRN MAM PERF RSLTS DOC: HCPCS | Performed by: NURSE PRACTITIONER

## 2022-03-28 PROCEDURE — G8399 PT W/DXA RESULTS DOCUMENT: HCPCS | Performed by: NURSE PRACTITIONER

## 2022-03-28 PROCEDURE — 1101F PT FALLS ASSESS-DOCD LE1/YR: CPT | Performed by: NURSE PRACTITIONER

## 2022-03-28 PROCEDURE — G8427 DOCREV CUR MEDS BY ELIG CLIN: HCPCS | Performed by: NURSE PRACTITIONER

## 2022-03-28 PROCEDURE — 1090F PRES/ABSN URINE INCON ASSESS: CPT | Performed by: NURSE PRACTITIONER

## 2022-03-28 PROCEDURE — G8536 NO DOC ELDER MAL SCRN: HCPCS | Performed by: NURSE PRACTITIONER

## 2022-03-28 PROCEDURE — G8432 DEP SCR NOT DOC, RNG: HCPCS | Performed by: NURSE PRACTITIONER

## 2022-03-28 PROCEDURE — G8421 BMI NOT CALCULATED: HCPCS | Performed by: NURSE PRACTITIONER

## 2022-03-28 PROCEDURE — 3017F COLORECTAL CA SCREEN DOC REV: CPT | Performed by: NURSE PRACTITIONER

## 2022-03-28 RX ORDER — ATORVASTATIN CALCIUM 40 MG/1
TABLET, FILM COATED ORAL
COMMUNITY
Start: 2021-04-09 | End: 2022-03-29 | Stop reason: SDUPTHER

## 2022-03-28 RX ORDER — CYCLOBENZAPRINE HCL 10 MG
10 TABLET ORAL
Qty: 30 TABLET | Refills: 0 | Status: SHIPPED | OUTPATIENT
Start: 2022-03-28 | End: 2022-05-19

## 2022-03-28 RX ORDER — FLUTICASONE PROPIONATE 50 MCG
SPRAY, SUSPENSION (ML) NASAL
COMMUNITY
Start: 2021-12-28

## 2022-03-28 RX ORDER — TRETINOIN 0.25 MG/G
CREAM TOPICAL
COMMUNITY
Start: 2022-03-17

## 2022-03-28 RX ORDER — CLOBETASOL PROPIONATE 0.5 MG/G
OINTMENT TOPICAL
COMMUNITY

## 2022-03-28 RX ORDER — TERCONAZOLE 8 MG/G
CREAM VAGINAL
COMMUNITY

## 2022-03-28 NOTE — PROGRESS NOTES
Luis Fernando Altmar (: 1955) is a 74 y.o. female, established patient, here for evaluation of the following chief complaint(s):   Follow-up       ASSESSMENT/PLAN:  Below is the assessment and plan developed based on review of pertinent history, labs, studies, and medications. 1. Neck pain  -     cyclobenzaprine (FLEXERIL) 10 mg tablet; Take 1 Tablet by mouth nightly as needed for Muscle Spasm(s). , Normal, Disp-30 Tablet, R-0  -     METABOLIC PANEL, COMPREHENSIVE  2. Pure hypercholesterolemia  -     CBC WITH AUTOMATED DIFF  -     RETICULOCYTE COUNT  -     LIPID PANEL  -     atorvastatin (LIPITOR) 40 mg tablet; Take 1 Tablet by mouth daily. , Normal, Disp-90 Tablet, R-3  3. Essential hypertension  -     amLODIPine (NORVASC) 10 mg tablet; TAKE 1 TABLET BY MOUTH DAILY FOR BLOOD PRESSURE. Indications: high blood pressure, Normal, Disp-90 Tablet, R-3  -     hydroCHLOROthiazide (MICROZIDE) 12.5 mg capsule; Take 1 capsule by mouth 2 x daily for blood pressure., Normal, Disp-180 Capsule, R-3  4. Vitamin D deficiency  -     VITAMIN D, 25 HYDROXY  5. Chronic migraine without aura without status migrainosus, not intractable  -     butalbital-acetaminophen-caffeine (FIORICET, ESGIC) -40 mg per tablet; Take 1 tab by mouth as needed for severe migraine., Normal, Disp-8 Tablet, R-0  6. Constipation, unspecified constipation type  -     senna-docusate (Senokot-S) 8.6-50 mg per tablet; Take 2 Tablets by mouth daily as needed for Constipation. , Normal, Disp-90 Tablet, R-3  7. Herpes labialis  -     acyclovir (ZOVIRAX) 400 mg tablet; Take 1 tab by mouth every 12 hours x 5 days for herpes outbreak., Normal, Disp-10 Tablet, R-1  8. Hemorrhoids, unspecified hemorrhoid type  -     hydrocortisone (Procto-Rip) 1 % rectal cream; Insert  into rectum two (2) times a day., Normal, Disp-30 g, R-3  9.  Peripheral edema  -     RHEUMATOID FACTOR, QL  -     LUPUS PROFILE  -     IMMUNOGLOBULINS, G/A/M, QT.  10. Hyperglycemia  - HEMOGLOBIN A1C WITH EAG  11. Malaise and fatigue  -     T4, FREE  -     TSH 3RD GENERATION      Return in about 3 months (around 6/28/2022) for Follow-up lab results, Follow-up chronic medical conditions. 1. Neck pain  Pt finds muscle relaxant to be helpful for symptoms. - cyclobenzaprine (FLEXERIL) 10 mg tablet; Take 1 Tablet by mouth nightly as needed for Muscle Spasm(s). Dispense: 30 Tablet; Refill: 0  - METABOLIC PANEL, COMPREHENSIVE    2. Pure hypercholesterolemia  Pt currently taking atorvastatin 40 mg daily. LDL was 136 mg/d/L 7/15/2021 vs 153 on 1/12/2021. Will adjust medication as indicated. - CBC WITH AUTOMATED DIFF  - RETICULOCYTE COUNT  - LIPID PANEL  - atorvastatin (LIPITOR) 40 mg tablet; Take 1 Tablet by mouth daily. Dispense: 90 Tablet; Refill: 3    3. Essential hypertension  Pt advised to continue to monitor her B/P. She is not receptive to any med changes at this time so will revisit at next encounter if B/P still elevated per home readings. - amLODIPine (NORVASC) 10 mg tablet; TAKE 1 TABLET BY MOUTH DAILY FOR BLOOD PRESSURE. Indications: high blood pressure  Dispense: 90 Tablet; Refill: 3  - hydroCHLOROthiazide (MICROZIDE) 12.5 mg capsule; Take 1 capsule by mouth 2 x daily for blood pressure. Dispense: 180 Capsule; Refill: 3    4. Vitamin D deficiency  Currently taking a daily supplement. Will adjust med dose if indicated by lab. s   - VITAMIN D, 25 HYDROXY    5. Chronic migraine without aura without status migrainosus, not intractable  Pt was again advised to limit use to no more than 8 a month due to chance of rebound headaches due to over use. Pt stated she does not use that often. - butalbital-acetaminophen-caffeine (FIORICET, ESGIC) -40 mg per tablet; Take 1 tab by mouth as needed for severe migraine. Dispense: 8 Tablet; Refill: 0    6. Constipation, unspecified constipation type  Medication effective for maintaining regular bowel regime.      - senna-docusate (Senokot-S) 8.6-50 mg per tablet; Take 2 Tablets by mouth daily as needed for Constipation. Dispense: 90 Tablet; Refill: 3    7. Herpes labialis  Stable- not having many outbreaks. Effective as treatment. - acyclovir (ZOVIRAX) 400 mg tablet; Take 1 tab by mouth every 12 hours x 5 days for herpes outbreak. Dispense: 10 Tablet; Refill: 1    8. Hemorrhoids, unspecified hemorrhoid type  Stable. - hydrocortisone (Procto-Rip) 1 % rectal cream; Insert  into rectum two (2) times a day. Dispense: 30 g; Refill: 3    9. Peripheral edema    - RHEUMATOID FACTOR, QL  - LUPUS PROFILE  - IMMUNOGLOBULINS, G/A/M, QT.    10. Hyperglycemia  Last labwork did not indicated prediabetes or diabetes. Continue to advise heart healthy diet.     - HEMOGLOBIN A1C WITH EAG    11. Malaise and fatigue    - T4, FREE  - TSH 3RD GENERATION        SUBJECTIVE/OBJECTIVE:    HPI     Pt presented for f/u. She needs many medications refilled. She would like to have lab work done and is due   Has c/o of swelling in her legs which seem to appear when there is a change in outdoor temperatures. She went to a beach in [place] in Feb 2022 at which time her feet were really swollen. She noticed a blister on her R foot and she popped it. Now the toes on her R foot are particularly swollen and so are her hands. She also has a growth on the R foot which was identified when she saw ortho provider Dr. Isabel Pa. He put her on a medrol pack which she is still taking and she was advised to get insoles for her feet. She has a f/u pending. Pt is under the care of a psychiatrist for anxiety who is ordering her alprazolam.    HTN- Pt stated that her B/P at home was 147/84 and this is usually the range she gets. She is currently taking amlodipine 10 mg tabs daily, atenolol 100 mg daily, and HCTZ 12.5 mg daily. Lipitor was d/c'd due to a chronic cough.   Discussed increasing the HCTZ dose or changing to chlorthalidone due to its longer half life or adding on an ARB  but she stated she was \"not going to start anything new\" at this time because she wanted to get whatever is going on w/ her R foot taken care of. Long hx of migraines- the migraines wax and wan depending on what is going on stress wise. Her father has dementia and his behavior at time is very upsetting to her and does not really want to discuss him. She also has scoliosis in her back which she thinks might also be contributing to her migraines. Review of Systems   Constitutional: Negative for appetite change, chills, fatigue, fever and unexpected weight change. HENT: Positive for ear pain. Negative for congestion, hearing loss, mouth sores, nosebleeds, postnasal drip, rhinorrhea, sinus pressure, sinus pain, sneezing, sore throat and trouble swallowing. R ear   Eyes: Negative for pain and itching. Respiratory: Negative for cough, chest tightness, shortness of breath and wheezing. Cardiovascular: Negative. Negative for chest pain and palpitations. Gastrointestinal: Positive for constipation. Negative for abdominal pain, diarrhea, nausea and vomiting. Genitourinary: Negative for difficulty urinating, dysuria, flank pain, frequency, hematuria and urgency. Musculoskeletal: Positive for arthralgias and myalgias. Skin: Negative. Negative for rash. Skin lesions on face- seeing a dermatologist.    Allergic/Immunologic: Negative for environmental allergies and food allergies. Neurological: Positive for light-headedness and headaches. Negative for dizziness, tremors, syncope, weakness and numbness. Hematological: Does not bruise/bleed easily. Psychiatric/Behavioral: Positive for dysphoric mood and sleep disturbance. The patient is nervous/anxious.          Has to take Seroquel for sleep        Physical Exam    [INSTRUCTIONS:  \"[x]\" Indicates a positive item  \"[]\" Indicates a negative item  -- DELETE ALL ITEMS NOT EXAMINED]    Constitutional: [x] Appears well-developed and well-nourished [x] No apparent distress      [] Abnormal -     Mental status: [x] Alert and awake  [x] Oriented to person/place/time [x] Able to follow commands    [] Abnormal -     Eyes:   EOM    [x]  Normal    [] Abnormal -   Sclera  [x]  Normal    [] Abnormal -          Discharge [x]  None visible   [] Abnormal -     HENT: [x] Normocephalic, atraumatic  [] Abnormal -   [] Mouth/Throat: Mucous membranes are moist    External Ears [x] Normal  [] Abnormal -    Neck: [x] No visualized mass [] Abnormal -     Pulmonary/Chest: [x] Respiratory effort normal   [x] No visualized signs of difficulty breathing or respiratory distress        [] Abnormal -      Musculoskeletal:   [] Normal gait with no signs of ataxia         [x] Normal range of motion of neck        [] Abnormal -     Neurological:        [x] No  Facial Asymmetry (Cranial nerve 7 motor function) (limited exam due to video visit)          [x] No gaze palsy        [] Abnormal -          Skin:        [x] No significant exanthematous lesions or discoloration noted on facial skin         [] Abnormal -            Psychiatric:       [x] Normal Affect [] Abnormal -        [x] No Hallucinations    Other pertinent observable physical exam findings:-      On this date 03/28/2022 I have spent 50 minutes reviewing previous notes, test results and face to face (virtual) with the patient discussing the diagnosis and importance of compliance with the treatment plan as well as documenting on the day of the visit. Simon Ramirez, was evaluated through a synchronous (real-time) audio-video encounter. The patient (or guardian if applicable) is aware that this is a billable service, which includes applicable co-pays. Verbal consent to proceed has been obtained.  The visit was conducted pursuant to the emergency declaration under the [address] [facility], [address] [place] [address] authority and the [employer] and [employer] [facility] Act.  Patient identification was verified, and a caregiver was present when appropriate. The patient was located at home in a state where the provider was licensed to provide care. An electronic signature was used to authenticate this note.   -- Amari Argueta NP   I  do attest that this note was reviewed and I was available for  TAL NGUYEN on this day of service and will follow along with TAL Bellamy MD

## 2022-03-29 PROBLEM — M54.2 NECK PAIN: Status: ACTIVE | Noted: 2022-03-29

## 2022-03-29 PROBLEM — E55.9 VITAMIN D DEFICIENCY: Status: ACTIVE | Noted: 2022-03-29

## 2022-03-29 PROBLEM — R53.81 MALAISE AND FATIGUE: Status: ACTIVE | Noted: 2022-03-29

## 2022-03-29 PROBLEM — G43.709 CHRONIC MIGRAINE WITHOUT AURA WITHOUT STATUS MIGRAINOSUS, NOT INTRACTABLE: Status: ACTIVE | Noted: 2022-03-29

## 2022-03-29 PROBLEM — E78.00 PURE HYPERCHOLESTEROLEMIA: Status: ACTIVE | Noted: 2022-03-29

## 2022-03-29 PROBLEM — M67.479: Status: ACTIVE | Noted: 2022-03-29

## 2022-03-29 PROBLEM — R73.9 HYPERGLYCEMIA: Status: ACTIVE | Noted: 2022-03-29

## 2022-03-29 PROBLEM — K64.9 HEMORRHOIDS: Status: ACTIVE | Noted: 2022-03-29

## 2022-03-29 PROBLEM — D22.5 MELANOCYTIC NEVI OF TRUNK: Status: ACTIVE | Noted: 2022-03-29

## 2022-03-29 PROBLEM — R53.83 MALAISE AND FATIGUE: Status: ACTIVE | Noted: 2022-03-29

## 2022-03-29 PROBLEM — M62.89 PELVIC FLOOR DYSFUNCTION: Status: ACTIVE | Noted: 2021-07-07

## 2022-03-29 PROBLEM — R60.0 PERIPHERAL EDEMA: Status: ACTIVE | Noted: 2022-03-29

## 2022-03-29 PROBLEM — Q82.8 POROKERATOSIS: Status: ACTIVE | Noted: 2022-03-29

## 2022-03-29 PROBLEM — L82.1 SEBORRHEIC KERATOSIS: Status: ACTIVE | Noted: 2022-03-29

## 2022-03-29 PROBLEM — D48.9 NEOPLASM OF UNCERTAIN BEHAVIOR: Status: ACTIVE | Noted: 2022-03-29

## 2022-03-29 PROBLEM — B00.1 HERPES LABIALIS: Status: ACTIVE | Noted: 2022-03-29

## 2022-03-29 PROBLEM — K59.00 CONSTIPATION: Status: ACTIVE | Noted: 2022-03-29

## 2022-03-29 PROBLEM — I10 ESSENTIAL HYPERTENSION: Status: ACTIVE | Noted: 2022-03-29

## 2022-03-29 PROBLEM — R60.9 PERIPHERAL EDEMA: Status: ACTIVE | Noted: 2022-03-29

## 2022-03-29 PROBLEM — L57.0 ACTINIC KERATOSIS: Status: ACTIVE | Noted: 2022-03-29

## 2022-03-29 RX ORDER — AMLODIPINE BESYLATE 10 MG/1
TABLET ORAL
Qty: 90 TABLET | Refills: 3 | Status: SHIPPED | OUTPATIENT
Start: 2022-03-29

## 2022-03-29 RX ORDER — ATORVASTATIN CALCIUM 40 MG/1
40 TABLET, FILM COATED ORAL DAILY
Qty: 90 TABLET | Refills: 3 | Status: SHIPPED | OUTPATIENT
Start: 2022-03-29

## 2022-03-29 RX ORDER — ACYCLOVIR 400 MG/1
TABLET ORAL
Qty: 10 TABLET | Refills: 1 | Status: SHIPPED | OUTPATIENT
Start: 2022-03-29 | End: 2022-04-14

## 2022-03-29 RX ORDER — METHYLPREDNISOLONE 4 MG/1
TABLET ORAL
COMMUNITY
Start: 2022-03-25 | End: 2022-08-30 | Stop reason: ALTCHOICE

## 2022-03-29 RX ORDER — HYDROCORTISONE 10 MG/G
CREAM TOPICAL 2 TIMES DAILY
Qty: 30 G | Refills: 3 | Status: SHIPPED | OUTPATIENT
Start: 2022-03-29 | End: 2022-06-28

## 2022-03-29 RX ORDER — DEXTROAMPHETAMINE SACCHARATE, AMPHETAMINE ASPARTATE, DEXTROAMPHETAMINE SULFATE AND AMPHETAMINE SULFATE 1.25; 1.25; 1.25; 1.25 MG/1; MG/1; MG/1; MG/1
5 TABLET ORAL
COMMUNITY

## 2022-03-29 RX ORDER — AMOXICILLIN 250 MG
2 CAPSULE ORAL
Qty: 90 TABLET | Refills: 3 | Status: SHIPPED | OUTPATIENT
Start: 2022-03-29

## 2022-03-29 RX ORDER — ATORVASTATIN CALCIUM 40 MG/1
40 TABLET, FILM COATED ORAL DAILY
Qty: 90 TABLET | Refills: 3 | Status: SHIPPED | OUTPATIENT
Start: 2022-03-29 | End: 2022-03-29 | Stop reason: SDUPTHER

## 2022-03-29 RX ORDER — HYDROCHLOROTHIAZIDE 12.5 MG/1
CAPSULE ORAL
Qty: 180 CAPSULE | Refills: 3 | Status: SHIPPED | OUTPATIENT
Start: 2022-03-29

## 2022-03-29 RX ORDER — BUTALBITAL, ACETAMINOPHEN AND CAFFEINE 50; 325; 40 MG/1; MG/1; MG/1
TABLET ORAL
Qty: 8 TABLET | Refills: 0 | Status: SHIPPED | OUTPATIENT
Start: 2022-03-29 | End: 2022-04-06

## 2022-04-03 PROBLEM — M85.80 OSTEOPENIA: Status: ACTIVE | Noted: 2022-04-03

## 2022-04-03 LAB
25(OH)D3+25(OH)D2 SERPL-MCNC: 49.5 NG/ML (ref 30–100)
ALBUMIN SERPL-MCNC: 4.8 G/DL (ref 3.8–4.8)
ALBUMIN/GLOB SERPL: 2.1 {RATIO} (ref 1.2–2.2)
ALP SERPL-CCNC: 54 IU/L (ref 44–121)
ALT SERPL-CCNC: 12 IU/L (ref 0–32)
ANA SER QL: NEGATIVE
AST SERPL-CCNC: 14 IU/L (ref 0–40)
BASOPHILS # BLD AUTO: 0.1 X10E3/UL (ref 0–0.2)
BASOPHILS NFR BLD AUTO: 1 %
BILIRUB SERPL-MCNC: 0.6 MG/DL (ref 0–1.2)
BUN SERPL-MCNC: 13 MG/DL (ref 8–27)
BUN/CREAT SERPL: 15 (ref 12–28)
CALCIUM SERPL-MCNC: 9.4 MG/DL (ref 8.7–10.3)
CHLORIDE SERPL-SCNC: 89 MMOL/L (ref 96–106)
CHOLEST SERPL-MCNC: 183 MG/DL (ref 100–199)
CO2 SERPL-SCNC: 28 MMOL/L (ref 20–29)
CREAT SERPL-MCNC: 0.87 MG/DL (ref 0.57–1)
DSDNA AB SER-ACNC: 1 IU/ML (ref 0–9)
EGFR: 74 ML/MIN/1.73
ENA RNP AB SER-ACNC: <0.2 AI (ref 0–0.9)
ENA SM AB SER-ACNC: <0.2 AI (ref 0–0.9)
EOSINOPHIL # BLD AUTO: 0.2 X10E3/UL (ref 0–0.4)
EOSINOPHIL NFR BLD AUTO: 7 %
ERYTHROCYTE [DISTWIDTH] IN BLOOD BY AUTOMATED COUNT: 12.7 % (ref 11.7–15.4)
EST. AVERAGE GLUCOSE BLD GHB EST-MCNC: 108 MG/DL
GLOBULIN SER CALC-MCNC: 2.3 G/DL (ref 1.5–4.5)
GLUCOSE SERPL-MCNC: 95 MG/DL (ref 65–99)
HBA1C MFR BLD: 5.4 % (ref 4.8–5.6)
HCT VFR BLD AUTO: 39.9 % (ref 34–46.6)
HDLC SERPL-MCNC: 60 MG/DL
HGB BLD-MCNC: 13.8 G/DL (ref 11.1–15.9)
IGA SERPL-MCNC: 66 MG/DL (ref 87–352)
IGG SERPL-MCNC: 907 MG/DL (ref 586–1602)
IGM SERPL-MCNC: 70 MG/DL (ref 26–217)
IMM GRANULOCYTES # BLD AUTO: 0 X10E3/UL (ref 0–0.1)
IMM GRANULOCYTES NFR BLD AUTO: 0 %
LDLC SERPL CALC-MCNC: 108 MG/DL (ref 0–99)
LYMPHOCYTES # BLD AUTO: 1.5 X10E3/UL (ref 0.7–3.1)
LYMPHOCYTES NFR BLD AUTO: 43 %
MCH RBC QN AUTO: 33.2 PG (ref 26.6–33)
MCHC RBC AUTO-ENTMCNC: 34.6 G/DL (ref 31.5–35.7)
MCV RBC AUTO: 96 FL (ref 79–97)
MONOCYTES # BLD AUTO: 0.4 X10E3/UL (ref 0.1–0.9)
MONOCYTES NFR BLD AUTO: 11 %
NEUTROPHILS # BLD AUTO: 1.4 X10E3/UL (ref 1.4–7)
NEUTROPHILS NFR BLD AUTO: 38 %
PLATELET # BLD AUTO: 260 X10E3/UL (ref 150–450)
POTASSIUM SERPL-SCNC: 3.4 MMOL/L (ref 3.5–5.2)
PROT SERPL-MCNC: 7.1 G/DL (ref 6–8.5)
RBC # BLD AUTO: 4.16 X10E6/UL (ref 3.77–5.28)
RETICS/RBC NFR AUTO: 1.2 % (ref 0.6–2.6)
RHEUMATOID FACT SERPL-ACNC: <10 IU/ML (ref 0–15)
SODIUM SERPL-SCNC: 130 MMOL/L (ref 134–144)
T4 FREE SERPL-MCNC: 1.33 NG/DL (ref 0.82–1.77)
TRIGL SERPL-MCNC: 81 MG/DL (ref 0–149)
TSH SERPL DL<=0.005 MIU/L-ACNC: 1.38 UIU/ML (ref 0.45–4.5)
VLDLC SERPL CALC-MCNC: 15 MG/DL (ref 5–40)
WBC # BLD AUTO: 3.5 X10E3/UL (ref 3.4–10.8)

## 2022-04-03 RX ORDER — ALPRAZOLAM 0.25 MG/1
1 TABLET ORAL
COMMUNITY
End: 2022-08-30 | Stop reason: ALTCHOICE

## 2022-04-06 DIAGNOSIS — G43.709 CHRONIC MIGRAINE WITHOUT AURA WITHOUT STATUS MIGRAINOSUS, NOT INTRACTABLE: ICD-10-CM

## 2022-04-06 RX ORDER — BUTALBITAL, ACETAMINOPHEN AND CAFFEINE 50; 325; 40 MG/1; MG/1; MG/1
TABLET ORAL
Qty: 8 TABLET | Refills: 0 | Status: SHIPPED | OUTPATIENT
Start: 2022-04-06

## 2022-05-13 ENCOUNTER — TELEPHONE (OUTPATIENT)
Dept: FAMILY MEDICINE CLINIC | Age: 67
End: 2022-05-13

## 2022-06-14 DIAGNOSIS — E87.6 HYPOKALEMIA: Primary | ICD-10-CM

## 2022-06-14 DIAGNOSIS — M54.2 NECK PAIN: ICD-10-CM

## 2022-06-14 DIAGNOSIS — E87.1 HYPONATREMIA: ICD-10-CM

## 2022-06-14 DIAGNOSIS — E87.0 HYPERNATREMIA: ICD-10-CM

## 2022-06-14 RX ORDER — CYCLOBENZAPRINE HCL 10 MG
TABLET ORAL
Qty: 90 TABLET | Refills: 1 | Status: SHIPPED | OUTPATIENT
Start: 2022-06-14

## 2022-08-03 ENCOUNTER — TELEPHONE (OUTPATIENT)
Dept: FAMILY MEDICINE CLINIC | Age: 67
End: 2022-08-03

## 2022-08-16 ENCOUNTER — TELEPHONE (OUTPATIENT)
Dept: FAMILY MEDICINE CLINIC | Age: 67
End: 2022-08-16

## 2022-08-18 NOTE — TELEPHONE ENCOUNTER
Pt called back and stated that she hadn't realized there was 6 months worth of the medication at [facility] since June and must have clicked the refill button as she was working on transferring everything to mail order. I stated we could transfer it to the mail order, but that we would contact [facility] and cancel what was remaining there. Pt declined doing that at this time and will just discuss it with the provider at her next visit.

## 2022-08-18 NOTE — TELEPHONE ENCOUNTER
Last OV: 3/28/22  Next OV: 8/30/22  Last Refill: 6/14/22 (QTY 90, Refills 1)      Most recent Rx was sent to [facility] although request is coming from mail order pharmacy. Called and LM for pt to call back and clarify where she needs this sent to.

## 2022-08-24 LAB
BUN SERPL-MCNC: 15 MG/DL (ref 8–27)
BUN/CREAT SERPL: 19 (ref 12–28)
CALCIUM SERPL-MCNC: 9.7 MG/DL (ref 8.7–10.3)
CHLORIDE SERPL-SCNC: 95 MMOL/L (ref 96–106)
CO2 SERPL-SCNC: 25 MMOL/L (ref 20–29)
CREAT SERPL-MCNC: 0.79 MG/DL (ref 0.57–1)
EGFR: 83 ML/MIN/1.73
GLUCOSE SERPL-MCNC: 103 MG/DL (ref 65–99)
POTASSIUM SERPL-SCNC: 3.8 MMOL/L (ref 3.5–5.2)
SODIUM SERPL-SCNC: 140 MMOL/L (ref 134–144)

## 2022-08-30 ENCOUNTER — TRANSCRIBE ORDER (OUTPATIENT)
Dept: GENERAL RADIOLOGY | Age: 67
End: 2022-08-30

## 2022-08-30 ENCOUNTER — OFFICE VISIT (OUTPATIENT)
Dept: FAMILY MEDICINE CLINIC | Age: 67
End: 2022-08-30
Payer: MEDICARE

## 2022-08-30 ENCOUNTER — HOSPITAL ENCOUNTER (OUTPATIENT)
Dept: GENERAL RADIOLOGY | Age: 67
Discharge: HOME | End: 2022-08-30
Payer: MEDICARE

## 2022-08-30 VITALS
WEIGHT: 128 LBS | HEIGHT: 67 IN | TEMPERATURE: 96.8 F | SYSTOLIC BLOOD PRESSURE: 124 MMHG | OXYGEN SATURATION: 96 % | DIASTOLIC BLOOD PRESSURE: 78 MMHG | BODY MASS INDEX: 20.09 KG/M2 | RESPIRATION RATE: 18 BRPM | HEART RATE: 60 BPM

## 2022-08-30 DIAGNOSIS — E78.00 PURE HYPERCHOLESTEROLEMIA: ICD-10-CM

## 2022-08-30 DIAGNOSIS — M19.90 OSTEOARTHRITIS: ICD-10-CM

## 2022-08-30 DIAGNOSIS — R73.9 HYPERGLYCEMIA: ICD-10-CM

## 2022-08-30 DIAGNOSIS — E55.9 VITAMIN D DEFICIENCY: ICD-10-CM

## 2022-08-30 DIAGNOSIS — I10 ESSENTIAL HYPERTENSION: Primary | ICD-10-CM

## 2022-08-30 DIAGNOSIS — M15.9 OSTEOARTHRITIS, GENERALIZED: ICD-10-CM

## 2022-08-30 DIAGNOSIS — M15.9 GENERALIZED OSTEOARTHRITIS: Primary | ICD-10-CM

## 2022-08-30 DIAGNOSIS — Z13.29 SCREENING FOR THYROID DISORDER: ICD-10-CM

## 2022-08-30 DIAGNOSIS — M15.9 GENERALIZED OSTEOARTHRITIS: ICD-10-CM

## 2022-08-30 PROCEDURE — 1123F ACP DISCUSS/DSCN MKR DOCD: CPT | Performed by: NURSE PRACTITIONER

## 2022-08-30 PROCEDURE — 3017F COLORECTAL CA SCREEN DOC REV: CPT | Performed by: NURSE PRACTITIONER

## 2022-08-30 PROCEDURE — 73130 X-RAY EXAM OF HAND: CPT

## 2022-08-30 PROCEDURE — G8427 DOCREV CUR MEDS BY ELIG CLIN: HCPCS | Performed by: NURSE PRACTITIONER

## 2022-08-30 PROCEDURE — G8536 NO DOC ELDER MAL SCRN: HCPCS | Performed by: NURSE PRACTITIONER

## 2022-08-30 PROCEDURE — 73620 X-RAY EXAM OF FOOT: CPT

## 2022-08-30 PROCEDURE — G8420 CALC BMI NORM PARAMETERS: HCPCS | Performed by: NURSE PRACTITIONER

## 2022-08-30 PROCEDURE — G9899 SCRN MAM PERF RSLTS DOC: HCPCS | Performed by: NURSE PRACTITIONER

## 2022-08-30 PROCEDURE — 1101F PT FALLS ASSESS-DOCD LE1/YR: CPT | Performed by: NURSE PRACTITIONER

## 2022-08-30 PROCEDURE — G8399 PT W/DXA RESULTS DOCUMENT: HCPCS | Performed by: NURSE PRACTITIONER

## 2022-08-30 PROCEDURE — G8754 DIAS BP LESS 90: HCPCS | Performed by: NURSE PRACTITIONER

## 2022-08-30 PROCEDURE — G8752 SYS BP LESS 140: HCPCS | Performed by: NURSE PRACTITIONER

## 2022-08-30 PROCEDURE — 1090F PRES/ABSN URINE INCON ASSESS: CPT | Performed by: NURSE PRACTITIONER

## 2022-08-30 PROCEDURE — G8432 DEP SCR NOT DOC, RNG: HCPCS | Performed by: NURSE PRACTITIONER

## 2022-08-30 PROCEDURE — 99214 OFFICE O/P EST MOD 30 MIN: CPT | Performed by: NURSE PRACTITIONER

## 2022-08-30 RX ORDER — ALPRAZOLAM 0.5 MG/1
TABLET ORAL
COMMUNITY
Start: 2022-07-10

## 2022-08-30 RX ORDER — MELOXICAM 7.5 MG/1
7.5 TABLET ORAL DAILY
COMMUNITY
Start: 2022-06-30

## 2022-08-30 RX ORDER — BUPROPION HYDROCHLORIDE 150 MG/1
TABLET ORAL
COMMUNITY
Start: 2022-07-08

## 2022-08-30 NOTE — PROGRESS NOTES
Visit Vitals  /78 (BP 1 Location: Right arm, BP Patient Position: Sitting)   Pulse 60   Temp 96.8 °F (36 °C) (Temporal)   Resp 18   Ht 5' 7\" (1.702 m)   Wt 128 lb (58.1 kg)   SpO2 96%   BMI 20.05 kg/m²     Chief Complaint   Patient presents with    Labs     Pt stated that she had labs done last week and would like to review them with the provider. 1. Have you been to the ER, urgent care clinic since your last visit? Hospitalized since your last visit? 2. Have you seen or consulted any other health care providers outside of the [address] [place] [address] since your last visit? Include any pap smears or colon screening.  Yes, [place] [facility]

## 2022-08-30 NOTE — PROGRESS NOTES
Chief Complaint   Patient presents with    Labs     Pt stated that she had labs done last week and would like to review them with the provider. Visit Vitals  /78 (BP 1 Location: Right arm, BP Patient Position: Sitting)   Pulse 60   Temp 96.8 °F (36 °C) (Temporal)   Resp 18   Ht 5' 7\" (1.702 m)   Wt 128 lb (58.1 kg)   SpO2 96%   BMI 20.05 kg/m²     Subjective  Dandy Jenkins is a 74 y.o. female. HPI  Patient presented for follow-up and would like to discuss her recent lab results which were done at rheumatology office of Dr. Baltazar Laurent. Pt last seen by me for virtual appt on 3/28/2022. All recent lab results in record  discussed w/ pt and questions answered. I was unable to see any lab results from her rheumatologist's office as not in her record at this time so unable to discuss results. Medical hx significant for many co-morbidities to include hypercholesterolemia, hypertension, vitamin D deficiency, chronic migraines, constipation, osteoarthritis of multiple joints, osteopenia, pelvic floor dysfunction, anxiety and depression. Patient has a number of stressors in her life to include recent decline of her father's medical and mental conditions. Patient is under the care of provider at [facility] where she has routine well woman follow-ups, mammograms, and will have a bone density done there in near future. She was having a lot of pain in her jaw and now has a mouth guard which is helpful. Apparently she is grinding her teeth. She is also seeing [facility] Dr. Ernie Chairez to address a small 'hole\" in her R foot which she has had for some time. No drainage   She also has a pending appt w/ rheumatologist Israel Jackson for c/o swollen joint of 2,3,and 4th toes of R food. She is also having \"crippling\" pain in many joints of both hands. Hypertension- B/P good in office at 124/78. Pt stated that she is off lisinopril because she developed a cough.  Her B/P has been hard to control in past. Was concerned about effect of Adderall on her B/P. Pt stated that she is trying to get off of it but so far feels she cannot. Current dose is 5 mg daily down from 10 mg daily which is Rxed by her psychiatrist.   Current med regimen for B/P is amlodipine 10 mg daily, HCTZ 12.5 mg daily, and atenolol 100 mg daily, Pt advised to check her B/P several times a week w/ goal < 130/80. She will continue on current med regime. Psychiatrist is also managing Rxes of alprazolam, anti-depressants and quetiapine for pt. Review of PDMP report indicated that pt last filled Rx for alprazolam 0. 5 mg on 7/11/2022 for 90 tabs x 30 days. Report also revealed that pt is using medical marijuana extract cartridges which are reported on PDMP report. Under care of provider Lexy Arora who may be an on line provider. It this is helping pt lower the dose and frequency of her other ordered controlled medications then I see some benefit to her. Hyperlipidemia- last LDL improved at 108 mg/dL vs 136 on 7/15/2021 and 153 mg/dL on 1/12/2021. Pt will continue to work on lifestyle measures and atorvastatin 40 mg daily. I will put in lab orders for pt to complete in a fasting state prior to next appt.      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    Digital mucinous cyst of toe M67.479    Actinic keratosis L57.0    Seborrheic keratosis L82.1    Melanocytic nevi of trunk D22.5    Neoplasm of uncertain behavior T87.1    Pelvic floor dysfunction M62.89    Porokeratosis Q82.8    Neck pain M54.2    Pure hypercholesterolemia E78.00    Essential hypertension I10    Vitamin D deficiency E55.9    Chronic migraine without aura without status migrainosus, not intractable G43.709    Constipation K59.00    Herpes labialis B00.1    Hemorrhoids K64.9    Peripheral edema R60.9    Hyperglycemia R73.9    Malaise and fatigue R53.81, R53.83    Osteopenia M85.80    Osteoarthritis of both feet M19.071, M19.072    Osteoarthritis of hands, bilateral M19.041, M19.042    Medical marijuana use Z79.899     Past Medical History:   Diagnosis Date    Anxiety     Cancer ([facility] [address] 2009    MELANOMA RIGTH ARM    Headache(784.0)     Nausea & vomiting     Psychiatric disorder 2006    PANIC DISORDER-SEVERE ANXIETY     Past Surgical History:   Procedure Laterality Date    HX APPENDECTOMY  2001    HX GI      colonscopy    HX MARIUSZ AND BSO  2001    hysterectomy, laser    HX UROLOGICAL  2006    SLING     Current Outpatient Medications   Medication Instructions    acyclovir (ZOVIRAX) 400 mg tablet TAKE 1 TABLET EVERY 12 HOURS FOR 5 DAYS FOR HERPES OUTBREAK    ALPRAZolam (XANAX) 0.5 mg tablet No dose, route, or frequency recorded. amLODIPine (NORVASC) 10 mg tablet TAKE 1 TABLET BY MOUTH DAILY FOR BLOOD PRESSURE. atenoloL (TENORMIN) 100 mg tablet Take 1 tab by mouth daily. atorvastatin (LIPITOR) 40 mg, Oral, DAILY    Biotin 5,000 mcg, Oral    buPROPion XL (WELLBUTRIN XL) 150 mg tablet No dose, route, or frequency recorded. butalbital-acetaminophen-caffeine (FIORICET, ESGIC) -40 mg per tablet TAKE 1 TABLET BY MOUTH AS NEEDED FOR SEVERE MIGRAINE.    calcium-cholecalciferol, d3, 600-125 mg-unit tab Take  by mouth.    clobetasoL (TEMOVATE) 0.05 % ointment clobetasol 0.05 % topical ointment   APPLY TOPICALLY TWO TIMES ADAY AS NEEDED    cyclobenzaprine (FLEXERIL) 10 mg tablet TAKE 1 TABLET BY MOUTH EVERY NIGHT AS NEEDED FOR MUSCLE SPASMS    dextroamphetamine-amphetamine (ADDERALL) 5 mg tablet 5 mg, Oral    escitalopram oxalate (LEXAPRO) 20 mg tablet escitalopram 20 mg tablet    fluticasone propionate (FLONASE) 50 mcg/actuation nasal spray SHAKE LIQUID AND USE 1 TO 2 SPRAYS IN EACH NOSTRIL DAILY    hydroCHLOROthiazide (MICROZIDE) 12.5 mg capsule Take 1 capsule by mouth 2 x daily for blood pressure. hydrocortisone (PROCTOCORT) 1 % rectal cream INSERT  INTO RECTUM TWO TIMES A DAY. hydroquinone (ESOTERICA, MELQUIN) 4 % topical cream hydroquinone 4 % topical cream   APPLY CREAM TO DARK AREAS ON BODY TWICE DAILY    meloxicam (MOBIC) 7.5 mg, Oral, DAILY    QUEtiapine (SEROQUEL) 50 mg, EVERY BEDTIME    senna-docusate (Senokot-S) 8.6-50 mg per tablet 2 Tablets, Oral, DAILY AS NEEDED    terconazole (TERAZOL 3) 0.8 % vaginal cream terconazole 0.8 % vaginal cream   Insert 1 applicator full into vagina weekly as directed. tretinoin (RETIN-A) 0.025 % topical cream 1 application in the evening to face     Allergies   Allergen Reactions    Adhesive Other (comments)     REDNESS AND BURNING    Adhesive Tape-Silicones Other (comments)     Other reaction(s): Other (comments)  REDNESS AND BURNING    Codeine Nausea and Vomiting    Hydrocodone Unknown (comments)     High anxiety    Morphine Other (comments)     Major anxiety    Oxycodone-Acetaminophen Other (comments)     aniexty     Social History     Tobacco Use    Smoking status: Former     Packs/day: 0.25     Years: 8.00     Pack years: 2.00     Types: Cigarettes     Quit date: 1991     Years since quittin.6    Smokeless tobacco: Never   Substance Use Topics    Alcohol use: Yes     Comment: RARELY    Drug use: No     Family History   Problem Relation Age of Onset    Cancer Mother 68        ovarian cancer WITH METS TO LIVER AND LUNGS AND MELANOMA    Anesth Problems Mother         N&V    Other Mother         LIVER CANCER    Hypertension Father     Cancer Father         MELANOMA    Cancer Sister         MELANOMA    Alzheimer's Disease Maternal Grandmother     Alzheimer's Disease Maternal Grandfather     Cancer Paternal Grandmother         LIVER    Cancer Sister         MELANOMA    Asthma Daughter         SPORTS INDUCED    Other Daughter         ? UTERINE POLYP     Review of Systems   Constitutional:  Negative for chills, fever and malaise/fatigue.    HENT: Negative for congestion, ear pain and sore throat. Eyes:  Negative for blurred vision. Respiratory:  Negative for cough, shortness of breath and wheezing. Cardiovascular:  Negative for chest pain, palpitations and leg swelling. Gastrointestinal:  Positive for vomiting. Negative for abdominal pain, constipation, diarrhea, heartburn and nausea. Genitourinary:  Negative for dysuria. Musculoskeletal:  Positive for joint pain. Negative for back pain, falls, myalgias and neck pain. Neurological:  Positive for dizziness and headaches. Negative for tingling, sensory change and weakness. Psychiatric/Behavioral:  Negative for depression. The patient is nervous/anxious and has insomnia. Objective  Physical Exam  Vitals reviewed. Constitutional:       General: She is not in acute distress. Appearance: Normal appearance. She is normal weight. HENT:      Head: Normocephalic. Right Ear: External ear normal.      Left Ear: External ear normal.      Nose: Nose normal.   Eyes:      Conjunctiva/sclera: Conjunctivae normal.   Neck:      Vascular: No carotid bruit. Cardiovascular:      Rate and Rhythm: Normal rate and regular rhythm. Heart sounds: Normal heart sounds. No murmur heard. Pulmonary:      Effort: Pulmonary effort is normal. No respiratory distress. Breath sounds: Normal breath sounds. Musculoskeletal:         General: No swelling or tenderness. Normal range of motion. Cervical back: Neck supple. Right lower leg: No edema. Left lower leg: No edema. Skin:     General: Skin is warm and dry. Coloration: Skin is not jaundiced. Findings: No lesion or rash. Neurological:      Mental Status: She is alert and oriented to person, place, and time. Motor: No weakness. Gait: Gait normal.   Psychiatric:         Mood and Affect: Mood normal.         Behavior: Behavior normal.         Thought Content:  Thought content normal.         Judgment: Judgment normal.       Assessment & Plan      ICD-10-CM ICD-9-CM    1. Essential hypertension  I10 401.9       2. Pure hypercholesterolemia  E78.00 272.0 CBC WITH AUTOMATED DIFF      RETICULOCYTE COUNT      LIPID PANEL      3. Vitamin D deficiency  E55.9 268.9 VITAMIN D, 25 HYDROXY      4. Hyperglycemia  A31.6 325.54 METABOLIC PANEL, COMPREHENSIVE      HEMOGLOBIN A1C WITH EAG      5. Screening for thyroid disorder  Z13.29 V77.0 TSH 3RD GENERATION      T4, FREE          1. Essential hypertension  Improved in office. Pt advised to check a few times a week w/ goal still < 130/80 and will continue on current med regimen. 2. Pure hypercholesterolemia  Improved. Will continue on statin at same dose and frequency. Will also continue to focus on lifestyle and diet. - CBC WITH AUTOMATED DIFF  - RETICULOCYTE COUNT  - LIPID PANEL    3. Vitamin D deficiency  Currently on a supplement. Will adjust dose if not at goal > 30.   - VITAMIN D, 25 HYDROXY    4. Hyperglycemia  Last A1c in normal range. Will continue to monitor.     - METABOLIC PANEL, COMPREHENSIVE  - HEMOGLOBIN A1C WITH EAG    5. Screening for thyroid disorder    - TSH 3RD GENERATION  - T4, FREE       I have discussed the diagnosis with the patient and the intended plan as seen in the above orders. Pt/caretaker has expressed understanding. Questions were answered concerning future plans. I have discussed medication side effects and warnings as indicated with the patient as well.     Heydi Hidalgo NP

## 2022-09-06 ENCOUNTER — TELEPHONE (OUTPATIENT)
Dept: FAMILY MEDICINE CLINIC | Age: 67
End: 2022-09-06

## 2022-09-06 PROBLEM — M19.071 OSTEOARTHRITIS OF BOTH FEET: Status: ACTIVE | Noted: 2022-09-06

## 2022-09-06 PROBLEM — M19.072 OSTEOARTHRITIS OF BOTH FEET: Status: ACTIVE | Noted: 2022-09-06

## 2022-09-06 PROBLEM — M19.042 OSTEOARTHRITIS OF HANDS, BILATERAL: Status: ACTIVE | Noted: 2022-09-06

## 2022-09-06 PROBLEM — M19.041 OSTEOARTHRITIS OF HANDS, BILATERAL: Status: ACTIVE | Noted: 2022-09-06

## 2022-09-06 PROBLEM — Z79.899 MEDICAL MARIJUANA USE: Status: ACTIVE | Noted: 2022-09-06

## 2022-09-06 NOTE — TELEPHONE ENCOUNTER
Please call the [facility] in [place] for any immunizations given pt to include Shingrix shingles shots and update medical record as needed. Thanks.

## 2022-09-07 NOTE — TELEPHONE ENCOUNTER
Called the pharmacy and all they had on file was to pt's two COVID shots which  I have updated along with other immunizations from the pt's [facility] portal report, also scanned into .

## 2022-09-26 NOTE — PROGRESS NOTES
Reviewed. [facility] / [facility] ([facility]) [address] [facility], [address]    Acknowledgment of Informed Consent for Surgical or Medical Procedure and Sedation  I agree to allow doctor(s) Jennifer Lugo and his/her associates or assistants, including residents and/or other qualified medical practitioner to perform the following medical treatment or procedure and to administer or direct the administration of sedation as necessary:  Procedure(s): Boogie Robison  My doctor has explained the following regarding the proposed procedure:   the explanation of the procedure   the benefits of the procedure   the potential problems that might occur during recuperation   the risks and side effects of the procedure which could include but are not limited to severe blood loss, infection, stroke or death   the benefits, risks and side effect of alternative procedures including the consequences of declining this procedure or any alternative procedures   the likelihood of achieving satisfactory results. I acknowledge no guarantee or assurance has been made to me regarding the results. I understand that during the course of this treatment/procedure, unforeseen conditions can occur which require an additional or different procedure. I agree to allow my physician or assistants to perform such extension of the original procedure as they may find necessary. I understand that sedation will often result in temporary impairment of memory and fine motor skills and that sedation can occasionally progress to a state of deep sedation or general anesthesia. I understand the risks of anesthesia for surgery include, but are not limited to, sore throat, hoarseness, injury to face, mouth, or teeth; nausea; headache; injury to blood vessels or nerves; death, brain damage, or paralysis.     I understand that if I have a Limitation of Treatment order in effect during my hospitalization, the order may or may not be in effect during this procedure. I give my doctor permission to give me blood or blood products. I understand that there are risks with receiving blood such as hepatitis, AIDS, fever, or allergic reaction. I acknowledge that the risks, benefits, and alternatives of this treatment have been explained to me and that no express or implied warranty has been given by the hospital, any blood bank, or any person or entity as to the blood or blood components transfused. At the discretion of my doctor, I agree to allow observers, equipment/product representatives and allow photographing, and/or televising of the procedure, provided my name or identity is maintained confidentially. I agree the hospital may dispose of or use for scientific or educational purposes any tissue, fluid, or body parts which may be removed.     ________________________________Date________Time______ am/pm  ([facility])  Patient or Signature of Closest Relative or Legal Guardian    ________________________________Date________Time______am/pm      Page 1 of  1  Witness

## 2023-01-01 DIAGNOSIS — K64.9 HEMORRHOIDS, UNSPECIFIED HEMORRHOID TYPE: ICD-10-CM

## 2023-01-03 RX ORDER — HYDROCORTISONE 10 MG/G
CREAM TOPICAL
Qty: 28 G | Refills: 0 | Status: SHIPPED | OUTPATIENT
Start: 2023-01-03

## 2023-02-18 DIAGNOSIS — K64.9 HEMORRHOIDS, UNSPECIFIED HEMORRHOID TYPE: ICD-10-CM

## 2023-02-20 RX ORDER — HYDROCORTISONE 10 MG/G
CREAM TOPICAL
OUTPATIENT
Start: 2023-02-20

## 2023-10-03 ENCOUNTER — TRANSCRIBE ORDERS (OUTPATIENT)
Facility: HOSPITAL | Age: 68
End: 2023-10-03

## 2023-10-03 DIAGNOSIS — D48.5 NEOPLASM OF UNCERTAIN BEHAVIOR OF SKIN: Primary | ICD-10-CM

## 2023-10-09 ENCOUNTER — HOSPITAL ENCOUNTER (OUTPATIENT)
Age: 68
Discharge: HOME | End: 2023-10-12
Payer: MEDICARE

## 2023-10-09 DIAGNOSIS — D48.5 NEOPLASM OF UNCERTAIN BEHAVIOR OF SKIN: ICD-10-CM

## 2023-10-09 PROCEDURE — 76536 US EXAM OF HEAD AND NECK: CPT

## 2024-04-18 ENCOUNTER — HOSPITAL ENCOUNTER (OUTPATIENT)
Age: 69
Discharge: HOME | End: 2024-04-18
Payer: MEDICARE

## 2024-04-18 DIAGNOSIS — M43.6 RIGHT CONTRACTURE OF NECK: ICD-10-CM

## 2024-04-18 DIAGNOSIS — R59.1 LYMPHADENOPATHY: ICD-10-CM

## 2024-04-18 PROCEDURE — 76536 US EXAM OF HEAD AND NECK: CPT
